# Patient Record
Sex: FEMALE | Race: BLACK OR AFRICAN AMERICAN | NOT HISPANIC OR LATINO | Employment: OTHER | URBAN - METROPOLITAN AREA
[De-identification: names, ages, dates, MRNs, and addresses within clinical notes are randomized per-mention and may not be internally consistent; named-entity substitution may affect disease eponyms.]

---

## 2019-01-10 PROBLEM — E11.9 DIABETES MELLITUS (HCC): Status: ACTIVE | Noted: 2019-01-10

## 2019-01-10 PROBLEM — J45.909 ASTHMA: Status: ACTIVE | Noted: 2019-01-10

## 2020-11-04 PROBLEM — G89.29 CHRONIC LEFT HIP PAIN: Status: ACTIVE | Noted: 2020-11-04

## 2020-11-13 PROBLEM — M54.10 RADICULOPATHY OF LEG: Status: ACTIVE | Noted: 2020-11-13

## 2022-10-21 ENCOUNTER — APPOINTMENT (EMERGENCY)
Dept: RADIOLOGY | Facility: HOSPITAL | Age: 87
End: 2022-10-21
Payer: MEDICARE

## 2022-10-21 ENCOUNTER — HOSPITAL ENCOUNTER (EMERGENCY)
Facility: HOSPITAL | Age: 87
Discharge: HOME | End: 2022-10-21
Attending: EMERGENCY MEDICINE
Payer: MEDICARE

## 2022-10-21 VITALS
DIASTOLIC BLOOD PRESSURE: 91 MMHG | RESPIRATION RATE: 16 BRPM | OXYGEN SATURATION: 100 % | SYSTOLIC BLOOD PRESSURE: 196 MMHG | BODY MASS INDEX: 38.75 KG/M2 | TEMPERATURE: 98.4 F | HEART RATE: 81 BPM | WEIGHT: 225.75 LBS

## 2022-10-21 DIAGNOSIS — R03.0 ELEVATED BLOOD PRESSURE READING: Primary | ICD-10-CM

## 2022-10-21 DIAGNOSIS — R07.9 CHEST PAIN: ICD-10-CM

## 2022-10-21 DIAGNOSIS — R51.9 HA (HEADACHE): ICD-10-CM

## 2022-10-21 DIAGNOSIS — I10 HYPERTENSION: ICD-10-CM

## 2022-10-21 LAB
2HR DELTA HS TROPONIN: 0 NG/L
ALBUMIN SERPL BCP-MCNC: 3.3 G/DL (ref 3.5–5)
ALP SERPL-CCNC: 124 U/L (ref 46–116)
ALT SERPL W P-5'-P-CCNC: 19 U/L (ref 12–78)
ANION GAP SERPL CALCULATED.3IONS-SCNC: 6 MMOL/L (ref 4–13)
AST SERPL W P-5'-P-CCNC: 20 U/L (ref 5–45)
ATRIAL RATE: 75 BPM
BASOPHILS # BLD AUTO: 0.04 THOUSANDS/ÂΜL (ref 0–0.1)
BASOPHILS NFR BLD AUTO: 0 % (ref 0–1)
BILIRUB SERPL-MCNC: 0.34 MG/DL (ref 0.2–1)
BUN SERPL-MCNC: 14 MG/DL (ref 5–25)
CALCIUM ALBUM COR SERPL-MCNC: 10.3 MG/DL (ref 8.3–10.1)
CALCIUM SERPL-MCNC: 9.7 MG/DL (ref 8.3–10.1)
CARDIAC TROPONIN I PNL SERPL HS: 8 NG/L
CARDIAC TROPONIN I PNL SERPL HS: 8 NG/L
CHLORIDE SERPL-SCNC: 101 MMOL/L (ref 96–108)
CO2 SERPL-SCNC: 35 MMOL/L (ref 21–32)
CREAT SERPL-MCNC: 0.96 MG/DL (ref 0.6–1.3)
EOSINOPHIL # BLD AUTO: 0.14 THOUSAND/ÂΜL (ref 0–0.61)
EOSINOPHIL NFR BLD AUTO: 1 % (ref 0–6)
ERYTHROCYTE [DISTWIDTH] IN BLOOD BY AUTOMATED COUNT: 16.1 % (ref 11.6–15.1)
GFR SERPL CREATININE-BSD FRML MDRD: 52 ML/MIN/1.73SQ M
GLUCOSE SERPL-MCNC: 139 MG/DL (ref 65–140)
HCT VFR BLD AUTO: 44.4 % (ref 34.8–46.1)
HGB BLD-MCNC: 13.9 G/DL (ref 11.5–15.4)
IMM GRANULOCYTES # BLD AUTO: 0.06 THOUSAND/UL (ref 0–0.2)
IMM GRANULOCYTES NFR BLD AUTO: 1 % (ref 0–2)
LYMPHOCYTES # BLD AUTO: 2.37 THOUSANDS/ÂΜL (ref 0.6–4.47)
LYMPHOCYTES NFR BLD AUTO: 24 % (ref 14–44)
MCH RBC QN AUTO: 26.2 PG (ref 26.8–34.3)
MCHC RBC AUTO-ENTMCNC: 31.3 G/DL (ref 31.4–37.4)
MCV RBC AUTO: 84 FL (ref 82–98)
MONOCYTES # BLD AUTO: 0.65 THOUSAND/ÂΜL (ref 0.17–1.22)
MONOCYTES NFR BLD AUTO: 7 % (ref 4–12)
NEUTROPHILS # BLD AUTO: 6.52 THOUSANDS/ÂΜL (ref 1.85–7.62)
NEUTS SEG NFR BLD AUTO: 67 % (ref 43–75)
NRBC BLD AUTO-RTO: 0 /100 WBCS
P AXIS: 71 DEGREES
PLATELET # BLD AUTO: 308 THOUSANDS/UL (ref 149–390)
PMV BLD AUTO: 10.5 FL (ref 8.9–12.7)
POTASSIUM SERPL-SCNC: 3.7 MMOL/L (ref 3.5–5.3)
PR INTERVAL: 274 MS
PROT SERPL-MCNC: 8.1 G/DL (ref 6.4–8.4)
QRS AXIS: -81 DEGREES
QRSD INTERVAL: 152 MS
QT INTERVAL: 444 MS
QTC INTERVAL: 495 MS
RBC # BLD AUTO: 5.31 MILLION/UL (ref 3.81–5.12)
SODIUM SERPL-SCNC: 142 MMOL/L (ref 135–147)
T WAVE AXIS: 89 DEGREES
VENTRICULAR RATE: 75 BPM
WBC # BLD AUTO: 9.78 THOUSAND/UL (ref 4.31–10.16)

## 2022-10-21 PROCEDURE — 96374 THER/PROPH/DIAG INJ IV PUSH: CPT

## 2022-10-21 PROCEDURE — 93005 ELECTROCARDIOGRAM TRACING: CPT

## 2022-10-21 PROCEDURE — 99284 EMERGENCY DEPT VISIT MOD MDM: CPT | Performed by: EMERGENCY MEDICINE

## 2022-10-21 PROCEDURE — 85025 COMPLETE CBC W/AUTO DIFF WBC: CPT | Performed by: EMERGENCY MEDICINE

## 2022-10-21 PROCEDURE — 36415 COLL VENOUS BLD VENIPUNCTURE: CPT | Performed by: EMERGENCY MEDICINE

## 2022-10-21 PROCEDURE — 80053 COMPREHEN METABOLIC PANEL: CPT | Performed by: EMERGENCY MEDICINE

## 2022-10-21 PROCEDURE — 93010 ELECTROCARDIOGRAM REPORT: CPT | Performed by: STUDENT IN AN ORGANIZED HEALTH CARE EDUCATION/TRAINING PROGRAM

## 2022-10-21 PROCEDURE — 96375 TX/PRO/DX INJ NEW DRUG ADDON: CPT

## 2022-10-21 PROCEDURE — 99284 EMERGENCY DEPT VISIT MOD MDM: CPT

## 2022-10-21 PROCEDURE — 84484 ASSAY OF TROPONIN QUANT: CPT | Performed by: EMERGENCY MEDICINE

## 2022-10-21 PROCEDURE — 71046 X-RAY EXAM CHEST 2 VIEWS: CPT

## 2022-10-21 RX ORDER — HYDRALAZINE HYDROCHLORIDE 20 MG/ML
5 INJECTION INTRAMUSCULAR; INTRAVENOUS ONCE
Status: COMPLETED | OUTPATIENT
Start: 2022-10-21 | End: 2022-10-21

## 2022-10-21 RX ORDER — LABETALOL HYDROCHLORIDE 5 MG/ML
10 INJECTION, SOLUTION INTRAVENOUS ONCE
Status: COMPLETED | OUTPATIENT
Start: 2022-10-21 | End: 2022-10-21

## 2022-10-21 RX ADMIN — HYDRALAZINE HYDROCHLORIDE 5 MG: 20 INJECTION, SOLUTION INTRAMUSCULAR; INTRAVENOUS at 19:21

## 2022-10-21 RX ADMIN — LABETALOL HYDROCHLORIDE 10 MG: 5 INJECTION, SOLUTION INTRAVENOUS at 20:11

## 2022-10-21 NOTE — ED PROVIDER NOTES
History  Chief Complaint   Patient presents with   • High Blood Pressure     C/o High blood pressure with systolic in the 457N for approx 1 month  Pt went to express care and had an abnormal ECG and was referred to the ED  Pt had associated symptoms of blurred vision, headache, dizziness      HPI    79-year-old female, past medical history significant for hypertension, presenting for evaluation of chest pain, headache, high blood pressure  Patient states that over the past month her blood pressure has been elevated to over 861 systolic despite her being compliant with her antihypertensive regimen  Last night she developed a headache, chest pain, recorded her blood pressure and found it to be greater than 717 systolic which prompted her to go to an urgent care this morning  The urgent care she was told that her blood pressure is too high and that her EKG was abnormal and was advised to go to the ED for further evaluation  At time evaluation patient denies any complaints, states that the chest pain and headache resolved last night  Denies any numbness, weakness, vision change, lightheadedness, dizziness  Prior to Admission Medications   Prescriptions Last Dose Informant Patient Reported? Taking?    Exenatide ER (Bydureon BCise) 2 MG/0 85ML AUIJ   Yes No   Sig: Bydureon BCise 2 mg/0 85 mL subcutaneous auto-injector   FREESTYLE LITE test strip   Yes No   Restasis 0 05 % ophthalmic emulsion   Yes No   ammonium lactate (LAC-HYDRIN) 12 % lotion   Yes No   Sig: Every 12 hours   aspirin (ECOTRIN LOW STRENGTH) 81 mg EC tablet  Child Yes No   Sig: Take 81 mg by mouth   azelastine (ASTELIN) 0 1 % nasal spray   Yes No   carvedilol (COREG) 25 mg tablet   Yes No   fluticasone (FLONASE) 50 mcg/act nasal spray   Yes No   Si spray into each nostril daily   furosemide (LASIX) 20 mg tablet   Yes No   Sig: Take 20 mg by mouth daily   gabapentin (NEURONTIN) 100 mg capsule   Yes No   Sig: Take 100 mg by mouth 3 (three) times a day   hydrALAZINE (APRESOLINE) 25 mg tablet   No No   Sig: Take 1 tablet (25 mg total) by mouth 3 (three) times a day   losartan (COZAAR) 100 MG tablet  Child Yes No   Sig: Take 150 mg by mouth   metFORMIN (GLUCOPHAGE) 1000 MG tablet  Child Yes No   Sig: Take 1,000 mg by mouth   polyvinyl alcohol (LIQUIFILM TEARS) 1 4 % ophthalmic solution  Child No No   Sig: Administer 1 drop to both eyes every 3 (three) hours as needed for dry eyes   rosuvastatin (CRESTOR) 20 MG tablet  Child No No   Sig: Take 1 tablet (20 mg total) by mouth daily at bedtime      Facility-Administered Medications: None       Past Medical History:   Diagnosis Date   • Asthma    • Chronic pain disorder    • Diabetes mellitus (HCC)    • Glaucoma    • Hyperlipidemia    • Hypertension    • Joint pain    • Low back pain    • Osteoarthritis        Past Surgical History:   Procedure Laterality Date   • EYE SURGERY      glaucoma       Family History   Problem Relation Age of Onset   • No Known Problems Mother    • No Known Problems Father    • No Known Problems Son      I have reviewed and agree with the history as documented  E-Cigarette/Vaping   • E-Cigarette Use Never User      E-Cigarette/Vaping Substances   • Nicotine No    • THC No    • CBD No    • Flavoring No    • Other No    • Unknown No      Social History     Tobacco Use   • Smoking status: Never Smoker   • Smokeless tobacco: Never Used   Vaping Use   • Vaping Use: Never used   Substance Use Topics   • Alcohol use: No   • Drug use: No        Review of Systems   Constitutional: Negative for chills and fever  HENT: Negative for sore throat  Respiratory: Negative for cough and shortness of breath  Cardiovascular: Positive for chest pain  Negative for palpitations and leg swelling  Gastrointestinal: Negative for abdominal pain, diarrhea, nausea and vomiting  Genitourinary: Negative for dysuria and frequency  Musculoskeletal: Negative for myalgias     Skin: Negative for rash and wound    Neurological: Positive for headaches  Negative for dizziness and light-headedness  All other systems reviewed and are negative  Physical Exam  ED Triage Vitals   Temperature Pulse Respirations Blood Pressure SpO2   10/21/22 1417 10/21/22 1419 10/21/22 1419 10/21/22 1419 10/21/22 1419   98 4 °F (36 9 °C) 74 16 (!) 202/88 98 %      Temp Source Heart Rate Source Patient Position - Orthostatic VS BP Location FiO2 (%)   10/21/22 1417 10/21/22 1419 10/21/22 1419 10/21/22 1419 --   Oral Monitor Sitting Left arm       Pain Score       --                    Orthostatic Vital Signs  Vitals:    10/21/22 1926 10/21/22 1940 10/21/22 2026 10/21/22 2035   BP: (!) 223/83 (S) (!) 203/79 (S) (!) 207/89 (!) 196/91   Pulse: 85  81    Patient Position - Orthostatic VS:   Lying        Physical Exam  Vitals and nursing note reviewed  Constitutional:       General: She is not in acute distress  Appearance: Normal appearance  She is obese  She is not ill-appearing or toxic-appearing  HENT:      Head: Normocephalic and atraumatic  Right Ear: External ear normal       Left Ear: External ear normal       Nose: Nose normal       Mouth/Throat:      Mouth: Mucous membranes are moist       Pharynx: Oropharynx is clear  Eyes:      General: No scleral icterus  Extraocular Movements: Extraocular movements intact  Cardiovascular:      Rate and Rhythm: Normal rate and regular rhythm  Pulses: Normal pulses  Pulmonary:      Effort: Pulmonary effort is normal  No respiratory distress  Breath sounds: Normal breath sounds  Abdominal:      Palpations: Abdomen is soft  Tenderness: There is no abdominal tenderness  There is no guarding or rebound  Musculoskeletal:         General: Normal range of motion  Cervical back: Normal range of motion and neck supple  Skin:     General: Skin is warm  Capillary Refill: Capillary refill takes less than 2 seconds     Neurological:      General: No focal deficit present  Mental Status: She is alert and oriented to person, place, and time           ED Medications  Medications   hydrALAZINE (APRESOLINE) injection 5 mg (5 mg Intravenous Given 10/21/22 1921)   labetalol (NORMODYNE) injection 10 mg (10 mg Intravenous Given 10/21/22 2011)       Diagnostic Studies  Results Reviewed     Procedure Component Value Units Date/Time    HS Troponin I 2hr [[id number]]  (Normal) Collected: 10/21/22 1942    Lab Status: Final result Specimen: Blood from Arm, Right Updated: 10/21/22 2012     hs TnI 2hr 8 ng/L      Delta 2hr hsTnI 0 ng/L     HS Troponin 0hr (reflex protocol) [[id number]]  (Normal) Collected: 10/21/22 1745    Lab Status: Final result Specimen: Blood from Arm, Right Updated: 10/21/22 1812     hs TnI 0hr 8 ng/L     Comprehensive metabolic panel [[id number]]  (Abnormal) Collected: 10/21/22 1745    Lab Status: Final result Specimen: Blood from Arm, Right Updated: 10/21/22 1811     Sodium 142 mmol/L      Potassium 3 7 mmol/L      Chloride 101 mmol/L      CO2 35 mmol/L      ANION GAP 6 mmol/L      BUN 14 mg/dL      Creatinine 0 96 mg/dL      Glucose 139 mg/dL      Calcium 9 7 mg/dL      Corrected Calcium 10 3 mg/dL      AST 20 U/L      ALT 19 U/L      Alkaline Phosphatase 124 U/L      Total Protein 8 1 g/dL      Albumin 3 3 g/dL      Total Bilirubin 0 34 mg/dL      eGFR 52 ml/min/1 73sq m     Narrative:      Charly guidelines for Chronic Kidney Disease (CKD):   •  Stage 1 with normal or high GFR (GFR > 90 mL/min/1 73 square meters)  •  Stage 2 Mild CKD (GFR = 60-89 mL/min/1 73 square meters)  •  Stage 3A Moderate CKD (GFR = 45-59 mL/min/1 73 square meters)  •  Stage 3B Moderate CKD (GFR = 30-44 mL/min/1 73 square meters)  •  Stage 4 Severe CKD (GFR = 15-29 mL/min/1 73 square meters)  •  Stage 5 End Stage CKD (GFR <15 mL/min/1 73 square meters)  Note: GFR calculation is accurate only with a steady state creatinine    CBC and differential [[id number]]  (Abnormal) Collected: 10/21/22 1745    Lab Status: Final result Specimen: Blood from Arm, Right Updated: 10/21/22 1751     WBC 9 78 Thousand/uL      RBC 5 31 Million/uL      Hemoglobin 13 9 g/dL      Hematocrit 44 4 %      MCV 84 fL      MCH 26 2 pg      MCHC 31 3 g/dL      RDW 16 1 %      MPV 10 5 fL      Platelets 532 Thousands/uL      nRBC 0 /100 WBCs      Neutrophils Relative 67 %      Immat GRANS % 1 %      Lymphocytes Relative 24 %      Monocytes Relative 7 %      Eosinophils Relative 1 %      Basophils Relative 0 %      Neutrophils Absolute 6 52 Thousands/µL      Immature Grans Absolute 0 06 Thousand/uL      Lymphocytes Absolute 2 37 Thousands/µL      Monocytes Absolute 0 65 Thousand/µL      Eosinophils Absolute 0 14 Thousand/µL      Basophils Absolute 0 04 Thousands/µL                  XR chest 2 views   Final Result by Johnna Appiah MD (10/22 6907)      No acute cardiopulmonary disease  Workstation performed: [id number]               Procedures  Procedures      ED Course  ED Course as of 10/22/22 1919   Fri Oct 21, 2022   1823 hs TnI 0hr: 8             HEART Risk Score    Flowsheet Row Most Recent Value   Heart Score Risk Calculator    History 0 Filed at: 10/22/2022 1919   ECG 1 Filed at: 10/22/2022 1919   Age 2 Filed at: 10/22/2022 1919   Risk Factors 2 Filed at: 10/22/2022 1919   Troponin 0 Filed at: 10/22/2022 1919   HEART Score 5 Filed at: 10/22/2022 1919                      SBIRT 22yo+    Flowsheet Row Most Recent Value   SBIRT (25 yo +)    In order to provide better care to our patients, we are screening all of our patients for alcohol and drug use  Would it be okay to ask you these screening questions?  No Filed at: 10/21/2022 1804                MDM  Number of Diagnoses or Management Options  Chest pain  Elevated blood pressure reading  HA (headache)  Hypertension  Diagnosis management comments: 80-year-old female, past medical history significant for hypertension, presenting for evaluation of an episode of headache, chest pain last night and elevated blood pressure  At time of evaluation patient is hypertensive but denies any complaints  Exam is benign  Will evaluate for chest pain with CBC, CMP, troponin, EKG, chest x-ray  Do not suspect headache is hypertensive bleed or emergency  Will hold off on acutely treating blood pressure at this time  Patient's evaluation including a delta troponin was unremarkable, patient receives a dose of hydralazine and labetalol to acutely lower her blood pressure  I discussed findings with the patient and her family, advised them to keep a blood pressure log and follow-up with their primary care physician for possible medication regimen alterations  Gave strict return to ED precautions  All questions were answered at this time  I reviewed all testing with the patient: see above  I gave oral return precautions for what to return for in addition to the written return precautions  The patient (and any family present: son, daughter) verbalized understanding of the discharge instructions and warnings that would necessitate return to the Emergency Department  I specifically highlighted areas of special concern regarding the written and verbal discharge instructions and return precautions  All questions were answered prior to discharge          Disposition  Final diagnoses:   Elevated blood pressure reading   Hypertension   Chest pain   HA (headache)     Time reflects when diagnosis was documented in both MDM as applicable and the Disposition within this note     Time User Action Codes Description Comment    10/21/2022  9:09 PM Lossie Lyme Add [R03 0] Elevated blood pressure reading     10/21/2022  9:09 PM Lossie Lyme Add [I10] Hypertension     10/21/2022  9:09 PM Lossie Lyme Add [R07 9] Chest pain     10/21/2022  9:09 PM Lossie Lyme Add [R51 9] HA (headache)       ED Disposition     ED Disposition Discharge    Condition   Stable    Date/Time   Fri Oct 21, 2022  9:11 PM    Comment   [facility] discharge to home/self care                 Follow-up Information     Follow up With Specialties Details Why Contact Info Additional Information    Sánchez Vazquez MD Family Medicine  For Emergency Department Follow-up [address] [facility]  [address]  [facility] [address]  [address] [facility] Emergency Department Emergency Medicine  If symptoms worsen [facility] [id number] [address] [facility] Emergency Department, [address] [facility] [place] , [place], [place], [address]          Discharge Medication List as of 10/21/2022  9:13 PM      CONTINUE these medications which have NOT CHANGED    Details   ammonium lactate (LAC-HYDRIN) 12 % lotion Every 12 hours, Historical Med      aspirin (ECOTRIN LOW STRENGTH) 81 mg EC tablet Take 81 mg by mouth, Starting Fri 8/3/2018, Historical Med      azelastine (ASTELIN) 0 1 % nasal spray Starting Thu 11/12/2020, Historical Med      carvedilol (COREG) 25 mg tablet Starting Thu 11/12/2020, Historical Med      Exenatide ER (Bydureon BCise) 2 MG/0 85ML AUIJ Bydureon BCise 2 mg/0 85 mL subcutaneous auto-injector, Historical Med      fluticasone (FLONASE) 50 mcg/act nasal spray 1 spray into each nostril daily, Historical Med      FREESTYLE LITE test strip Historical Med      furosemide (LASIX) 20 mg tablet Take 20 mg by mouth daily, Historical Med      gabapentin (NEURONTIN) 100 mg capsule Take 100 mg by mouth 3 (three) times a day, Historical Med      hydrALAZINE (APRESOLINE) 25 mg tablet Take 1 tablet (25 mg total) by mouth 3 (three) times a day, Starting Fri 11/6/2020, Until Sun 12/6/2020, Normal      losartan (COZAAR) 100 MG tablet Take 150 mg by mouth, Starting Tue 11/6/2018, Historical Med      metFORMIN (GLUCOPHAGE) 1000 MG tablet Take 1,000 mg by mouth, Starting Tue 11/6/2018, Historical Med polyvinyl alcohol (LIQUIFILM TEARS) 1 4 % ophthalmic solution Administer 1 drop to both eyes every 3 (three) hours as needed for dry eyes, Starting Sun 1/13/2019, Print      Restasis 0 05 % ophthalmic emulsion Starting Thu 11/19/2020, Historical Med      rosuvastatin (CRESTOR) 20 MG tablet Take 1 tablet (20 mg total) by mouth daily at bedtime, Starting Fri 4/5/2019, Normal           No discharge procedures on file  PDMP Review     None           ED Provider  Attending physically available and evaluated Tia Palencia I managed the patient along with the ED Attending      Electronically Signed by         Beata Layton DO  10/22/22 1124

## 2022-10-21 NOTE — ED ATTENDING ATTESTATION
10/21/2022  ILolis MD, saw and evaluated the patient  I have discussed the patient with the resident/non-physician practitioner and agree with the resident's/non-physician practitioner's findings, Plan of Care, and MDM as documented in the resident's/non-physician practitioner's note, except where noted  All available labs and Radiology studies were reviewed  I was present for key portions of any procedure(s) performed by the resident/non-physician practitioner and I was immediately available to provide assistance  At this point I agree with the current assessment done in the Emergency Department  I have conducted an independent evaluation of this patient a history and physical is as follows:  Patient is an 81 yo female, hx of HTN, with c/o elevated BP reads for past couple of month, had chest pain last night, BP was 220s, EKG abnormal at  today, advised to come to ER, denies any symptoms currently, no numbness, or vision changes at present  On exam, patient is conscious, alert, no acute distress, vital signs noted, blood pressure 179/72, no cranial nerve or focal neuro deficits, normal coordination, finger-to-nose test; no respiratory distress, pulses equal bilaterally, abdomen soft nontender, no peripheral edema, no calf tenderness swelling  Impression:  Hypertension, chest pain last night, will do cardiac workup including labs, EKG, chest x-ray  ED Course  ED Course as of 10/22/22 1804   Fri Oct 21, 2022   1911 Patient developed systolic HTN, /40 in left arm, 230/90 in right arm, no chest pain  We will re-check, give dose of Hydralazine if still high on repeat check  Patient was given IV hydralazine for systolic hypertension, no acute abnormality on labs, was advised to follow up with PCP for blood pressure control  Return to emergency for recurrent or worsening symptoms      Critical Care Time  Procedures

## 2022-10-22 LAB
ATRIAL RATE: 81 BPM
P AXIS: 76 DEGREES
PR INTERVAL: 176 MS
QRS AXIS: -80 DEGREES
QRSD INTERVAL: 152 MS
QT INTERVAL: 426 MS
QTC INTERVAL: 494 MS
T WAVE AXIS: 91 DEGREES
VENTRICULAR RATE: 81 BPM

## 2022-10-22 PROCEDURE — 93010 ELECTROCARDIOGRAM REPORT: CPT | Performed by: STUDENT IN AN ORGANIZED HEALTH CARE EDUCATION/TRAINING PROGRAM

## 2022-10-22 NOTE — DISCHARGE INSTRUCTIONS
Your evaluation was negative for today  Your blood pressure is elevated  Follow up with your primary care physician in order to adjust your anti-hypertensive medications  Return to the ED if symptoms worsen